# Patient Record
(demographics unavailable — no encounter records)

---

## 2025-03-18 NOTE — END OF VISIT
[Time Spent: ___ minutes] : I have spent [unfilled] minutes of time on the encounter which excludes teaching and separately reported services. [FreeTextEntry3] :  I, Jeff Shultz, am scribing for and in the presence of Dr. Rosa Maria Jara in the following sections: HISTORY OF PRESENT ILLNESS; REVIEW OF SYSTEMS; PHYSICAL EXAM; ASSESSMENT/ PLAN.I, Rosa Maria Jara, personally performed the services described in the documentation, reviewed the documentation recorded by the scribe in my presence, and it accurately and completely records my words and actions. 03/17/25

## 2025-03-18 NOTE — HISTORY OF PRESENT ILLNESS
[FreeTextEntry1] : Last Reclast 6/17/2022 Mr. PAPO BARNES is a 61 year old male  coming for a f/u past hospital admission for severe hypophosphatemia secondary to hyperparathyroidism which was secondary to hypocalcemia, has not been taking his calcium supplements for few days as he was not feeling good Seen me for severe osteoporosis with h/o bilateral hip fractures, hyperparathyroidism, low D and hypocalcemia secondary to Chron's disease on Stelara shot every other month Dr Valencia  on Vit D 50,000 IU 3 times a week alternating with 2 times a week every other week , he held it for a month of May only, levels high 4/23  started Forteo May 10 2019 then first Reclast infusion in May 2021 with no side effects last DEXA 10/2022 mildy worse after 9/21 much improved likely due to not enough calcium  on IV calcium each Friday at the infusion center, last one a month ago  labs done much improved   Ca citrate 950mg 3 tab bid  Clacium IV every other week  also iron IV every 6 weeks  reports compliance   24hr urine low calcium while low D    total thyroidectomy and central compartment  dissection 9/11/2020 Pathology showing tracheal lymph node positive for metastatic papillary thyroid carcinoma 0.9 cm. Papillary thyroid carcinoma classic variant left sided 0.8 cm in greatest dimension. No evidence of lymphatic or vascular invasion no extra thyroid extension. 8 of 10 lymph nodes positive for metastatic papillary thyroid carcinoma.  Large test metastatic focus is 0.7 cm in greatest dimension extra low dose extension present. Thyroid gland left sided within normal limits. Tihymic tissue present.  received iodine 131 at 29.3 mCi in October 2020 Posterior ideation scan showed focal site of increased tracer localization in the neck to the right of midline as was evident on prior study on October 23, 1920.  started Levothyroxine 125 mcg on 9/18/2020 no side effects dose increased to 137 mcg on November 2020  last DEXA 9/2020 As compared to the patient's most recent study dated 9/12/2019, there has been a statistically significant interval increase in bone density at both the lumbar spine and left hip with no s tatistically significant change noted at the left forearm. worse T score LFN -2.8  pt went back to 2 vid D per week , Dr Aragon told him he has resistance to Vit D , trying for the last month has Calcium 9.1  6/25/24  had phosphate infusion 4 weeks from Dr Ott   10/01/24 Pt is doing well. He had iron infusion last Friday September 25th and it was right after lab work.  03/17/25 Patient is feeling good. No active complaints. He was let go from his job and is looking for a new one.

## 2025-03-19 NOTE — ASSESSMENT
[FreeTextEntry1] : Crohn's disease  - Check baseline hepatitis panel, repeat TB QuantiFERON. - Continue Stelara - medication approved with insurance. - Annual dermatology - declined referral today.  - Recall EGD and colonoscopy 2026.  - B12 injection administered today.  - F/u with Dr. Saenz scheduled for May.

## 2025-03-19 NOTE — HISTORY OF PRESENT ILLNESS
[de-identified] : Dr. Pritchard  4/5/24 Indications: Crohn's disease and abnormal MRI  Findings:    Esophagus Mucosa Normal mucosa was noted in the whole esophagus.    Stomach Mucosa Normal mucosa was noted in the whole stomach.    Duodenum Mucosa Normal mucosa was noted in the whole examined duodenum.    Jejunum Additional jejunum findings The visualized portion of the jejunum  appeared normal.Multiple cold biopsies were performed in the jejunum.    Impressions:    The visualized portion of the jejunum appeared normal (Biopsy).    Plan:    Await pathology results.    Continue current medical regimen.    Follow up with Referring Provider - Dr. Rubin    Specimens:    Jar # 1 :    Biopsy in the jejunum    Test(s) requested: Histology    Pathology:    Pathology was sent to lab, waiting for results    Sergio Pritchard MD    Version 1, Electronically signed on 4/5/2024 12:34:07 PM by Sergio Pritchard MD   [FreeTextEntry1] : MRE 10/2023 IMPRESSION:  Known Crohn's disease with persistent mural enhancement without wall thickening in distal small bowel loops, which is a nonspecific imaging sign and may reflect inflammation, however no other mural or mesenteric findings to indicate active inflammatory small bowel Crohn's disease.  No evidence of stricture. No imaging signs of penetrating disease.  Chronic central mesenteric fibrofatty proliferation.  MRE 12/2024 IMPRESSION:  *  Stable appearance of mildly patulous distal ileum without evidence for significant disease activity.  *  No evidence of fibrostenotic disease.  *  No evidence of penetrating disease.

## 2025-04-24 NOTE — HEALTH RISK ASSESSMENT
[Good] : ~his/her~  mood as  good [No] : No [No falls in past year] : Patient reported no falls in the past year [0] : 2) Feeling down, depressed, or hopeless: Not at all (0) [Patient reported colonoscopy was normal] : Patient reported colonoscopy was normal [Fully functional (bathing, dressing, toileting, transferring, walking, feeding)] : Fully functional (bathing, dressing, toileting, transferring, walking, feeding) [Fully functional (using the telephone, shopping, preparing meals, housekeeping, doing laundry, using] : Fully functional and needs no help or supervision to perform IADLs (using the telephone, shopping, preparing meals, housekeeping, doing laundry, using transportation, managing medications and managing finances) [Never] : Never [PHQ-2 Negative - No further assessment needed] : PHQ-2 Negative - No further assessment needed [de-identified] : endocrinology, hematology, gastroenterology [de-identified] : walking [de-identified] : regular [[id number]] : 0 [With Family] : lives with family [Employed] : employed [] :  [# Of Children ___] : has [unfilled] children [Feels Safe at Home] : Feels safe at home [Reports changes in hearing] : Reports no changes in hearing [Reports changes in vision] : Reports no changes in vision [Reports normal functional visual acuity (ie: able to read med bottle)] : Reports normal functional visual acuity [ColonoscopyDate] : 12/23 [ColonoscopyComments] : repeat in 2025

## 2025-04-24 NOTE — ADDENDUM
[FreeTextEntry1] : Patient presented for Heplisav B vaccination as ordered by Dr. Clayton. Prior to administration, reviewed Heplisav B VIS with patient who verbalized understanding and consent.  Patient tolerated vaccination well in left deltoid. No immediate adverse reaction noted. Patient provided Heplisav B VIS for home review as per protocol.  Patient instructed to schedule Heplisav B vaccination 1 month from today.  -Dynavax NDC#77416-663-40 Lot#[id number] Exp. 01/31/2027  Ruby Best LPN

## 2025-04-24 NOTE — HISTORY OF PRESENT ILLNESS
[FreeTextEntry1] : Establish care, annual, forms [de-identified] : 62 yo M with complicated medical history, here to establish care with new primary care physician and for annual physical.  Patient is a 62 yo M with Crohn's disease, hx of cryptogenic CVA, hx of DVT x3, Barretts esophagus, s/p total thyroidectomy, hypothyroidism, osteoporosis, hx of copper, iron, b12, zinc deficiency, presenting for annual physical. Patient reports no acute concerns or complaints at this time except needs titers and forms filled out for work employment forms.   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 Patient needs a Hep B vaccination today; non-immune. Recent TB testing is negative.   Denies any headaches, chest pain, shortness of breath, nausea/vomiting, diarrhea, constipation, changes in vision.

## 2025-04-24 NOTE — PLAN
[FreeTextEntry1] : Patient is a 62 yo M with Crohn's disease, hx of cryptogenic CVA, hx of DVT x3, Barretts esophagus, s/p total thyroidectomy, hypothyroidism, osteoporosis, hx of copper, iron, b12, zinc deficiency, presenting for annual physical. Patient reports no acute concerns or complaints at this time except needs titers and forms filled out for work employment forms.   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 Patient needs a Hep B vaccination today; non-immune. Recent TB testing is negative.   Crohn's disease - s/p two bowel resections, 1998, 2015, follows with GI. Now on Stellara q 8 weeks, stable, no acute flares recently, inflammatory markers WNL reviewed labs. Colonoscopies as per GI team.  Hx of CVA on Eliquis, DVT x3 - now on coumadin, goal INR between 2-3, closer to 2.5 ideal, check INR today, taking 3mg x 6 days a week and 1.5mg one day a week Barrets Esophagus - taking PPI daily, endoscopy UTD Hypothyroidism - s/p total thyroidectomy, pathology showing metastatic papillary thyroid carcinoma previously, now on supplementation thyroid, follows with endocrinology, check labs today B12 deficiency - obtains injections q 6 weeks from GI Iron deficiency - q every other month infusion via hematology Hx of copper infusion due to deficiency Zinc deficiency - takes oral supplements Osteoporosis - follows with endocrinology, completed 1.5 years Forteo with improvement in DEXA, now on Reclast annually  Labs drawn in office Will call with results Hep B vaccine today, start series and return for completion x3 doses total Check MMRV immunity today and complete paperwork for employment All questions answered Exam reassuring Follow up specialists as scheduled

## 2025-06-03 NOTE — HISTORY OF PRESENT ILLNESS
[Home] : at home, [unfilled] , at the time of the visit. [Medical Office: ([facility])___] : at the medical office located in  [Telehealth (audio & video)] : This visit was provided via telehealth using real-time 2-way audio visual technology. [Verbal consent obtained from patient] : the patient, [unfilled] [FreeTextEntry1] : Last Reclast 3/28/2024,1/12/2024 and 6/17/2022 Mr. PAPO BARNES is a 61 year old male  coming for a f/u past hospital admission for severe hypophosphatemia secondary to hyperparathyroidism which was secondary to hypocalcemia, has not been taking his calcium supplements for few days as he was not feeling good Seen me for severe osteoporosis with h/o bilateral hip fractures, hyperparathyroidism, low D and hypocalcemia secondary to Chron's disease on Stelara shot every other month Dr Valencia  on Vit D 50,000 IU 3 times a week alternating with 2 times a week every other week , he held it for a month of May only, levels high 4/23  started Forteo May 10 2019 then first Reclast infusion in May 2021 with no side effects last DEXA 10/2022 mildy worse after 9/21 much improved likely due to not enough calcium  on IV calcium each Friday at the infusion center, last one a month ago  labs done much improved   Ca citrate 950mg 3 tab bid  Clacium IV every other week  also iron IV every 6 weeks  reports compliance   24hr urine low calcium while low D    total thyroidectomy and central compartment  dissection 9/11/2020 Pathology showing tracheal lymph node positive for metastatic papillary thyroid carcinoma 0.9 cm. Papillary thyroid carcinoma classic variant left sided 0.8 cm in greatest dimension. No evidence of lymphatic or vascular invasion no extra thyroid extension. 8 of 10 lymph nodes positive for metastatic papillary thyroid carcinoma.  Large test metastatic focus is 0.7 cm in greatest dimension extra low dose extension present. Thyroid gland left sided within normal limits. Tihymic tissue present.  received iodine 131 at 29.3 mCi in October 2020 Posterior ideation scan showed focal site of increased tracer localization in the neck to the right of midline as was evident on prior study on October 23, 1920.  started Levothyroxine 125 mcg on 9/18/2020 no side effects dose increased to 137 mcg on November 2020  last DEXA 9/2020 As compared to the patient's most recent study dated 9/12/2019, there has been a statistically significant interval increase in bone density at both the lumbar spine and left hip with no s tatistically significant change noted at the left forearm. worse T score LFN -2.8  pt went back to 2 vid D per week , Dr Aragon told him he has resistance to Vit D , trying for the last month has Calcium 9.1  6/25/24  had phosphate infusion 4 weeks from Dr Ott   10/01/24 Pt is doing well. He had iron infusion last Friday September 25th and it was right after lab work.  03/17/25 Patient is feeling good. No active complaints. He was let go from his job and is looking for a new one.   06/03/25  Patient is doing well, no active complaints. He has a new job.  Will have bloodwork done on Friday for Dr. Ott, per pt.  Lab missing some orders, such as TSH and Vitamin D. Will contact lab to see if they can have added to labwork. If not, will see if it can be added to Dr. Ott's labwork to be done Friday 6/6. Advised to follow up in 5 days.

## 2025-06-03 NOTE — HISTORY OF PRESENT ILLNESS
[Home] : at home, [unfilled] , at the time of the visit. [Medical Office: ([facility])___] : at the medical office located in  [Telehealth (audio & video)] : This visit was provided via telehealth using real-time 2-way audio visual technology. [Verbal consent obtained from patient] : the patient, [unfilled] [FreeTextEntry1] : Last Reclast 3/28/2024,1/12/2024 and 6/17/2022 Mr. PAPO BARNES is a 61 year old male  coming for a f/u past hospital admission for severe hypophosphatemia secondary to hyperparathyroidism which was secondary to hypocalcemia, has not been taking his calcium supplements for few days as he was not feeling good Seen me for severe osteoporosis with h/o bilateral hip fractures, hyperparathyroidism, low D and hypocalcemia secondary to Chron's disease on Stelara shot every other month Dr Valencia  on Vit D 50,000 IU 3 times a week alternating with 2 times a week every other week , he held it for a month of May only, levels high 4/23  started Forteo May 10 2019 then first Reclast infusion in May 2021 with no side effects last DEXA 10/2022 mildy worse after 9/21 much improved likely due to not enough calcium  on IV calcium each Friday at the infusion center, last one a month ago  labs done much improved   Ca citrate 950mg 3 tab bid  Clacium IV every other week  also iron IV every 6 weeks  reports compliance   24hr urine low calcium while low D    total thyroidectomy and central compartment  dissection 9/11/2020 Pathology showing tracheal lymph node positive for metastatic papillary thyroid carcinoma 0.9 cm. Papillary thyroid carcinoma classic variant left sided 0.8 cm in greatest dimension. No evidence of lymphatic or vascular invasion no extra thyroid extension. 8 of 10 lymph nodes positive for metastatic papillary thyroid carcinoma.  Large test metastatic focus is 0.7 cm in greatest dimension extra low dose extension present. Thyroid gland left sided within normal limits. Tihymic tissue present.  received iodine 131 at 29.3 mCi in October 2020 Posterior ideation scan showed focal site of increased tracer localization in the neck to the right of midline as was evident on prior study on October 23, 1920.  started Levothyroxine 125 mcg on 9/18/2020 no side effects dose increased to 137 mcg on November 2020  last DEXA 9/2020 As compared to the patient's most recent study dated 9/12/2019, there has been a statistically significant interval increase in bone density at both the lumbar spine and left hip with no s tatistically significant change noted at the left forearm. worse T score LFN -2.8  pt went back to 2 vid D per week , Dr Aragon told him he has resistance to Vit D , trying for the last month has Calcium 9.1  6/25/24  had phosphate infusion 4 weeks from Dr Ott   10/01/24 Pt is doing well. He had iron infusion last Friday September 25th and it was right after lab work.  03/17/25 Patient is feeling good. No active complaints. He was let go from his job and is looking for a new one.   06/03/25  Patient is doing well, no active complaints. He has a new job.  Will have bloodwork done on Friday for Dr. Ott, per pt.  Lab missing some orders, such as TSH and Vitamin D. Will contact lab to see if they can have added to labwork. If not, will see if it can be added to Dr. Ott's labwork to be done Friday 6/6. Advised to follow up in 5 days.

## 2025-06-03 NOTE — PHYSICAL EXAM
[Alert] : alert [Well Nourished] : well nourished [No Acute Distress] : no acute distress [Well Developed] : well developed [No Respiratory Distress] : no respiratory distress [No Rash] : no rash [Normal Reflexes] : deep tendon reflexes were 2+ and symmetric [No Tremors] : no tremors [Oriented x3] : oriented to person, place, and time [Acanthosis Nigricans] : no acanthosis nigricans [de-identified] : on face

## 2025-06-03 NOTE — REASON FOR VISIT
[Follow - Up] : a follow-up visit [Hypothyroidism] : hypothyroidism [Osteoporosis] : osteoporosis [Hyperparathyroidism] : hyperparathyroidism [Thyroid Cancer] : thyroid cancer [Other___] : [unfilled]

## 2025-06-03 NOTE — END OF VISIT
[Time Spent: ___ minutes] : I have spent [unfilled] minutes of time on the encounter which excludes teaching and separately reported services. [FreeTextEntry3] :  I, Faith Boone, am scribing for and in the presence of Dr. Rosa Maria Jara in the following sections: HISTORY OF PRESENT ILLNESS; REVIEW OF SYSTEMS; PHYSICAL EXAM; ASSESSMENT/ PLAN.I, Rosa Maria Jara, personally performed the services described in the documentation, reviewed the documentation recorded by the scribe in my presence, and it accurately and completely records my words and actions. 06/03/25

## 2025-06-03 NOTE — PHYSICAL EXAM
[Alert] : alert [Well Nourished] : well nourished [No Acute Distress] : no acute distress [Well Developed] : well developed [No Respiratory Distress] : no respiratory distress [No Rash] : no rash [Normal Reflexes] : deep tendon reflexes were 2+ and symmetric [No Tremors] : no tremors [Oriented x3] : oriented to person, place, and time [Acanthosis Nigricans] : no acanthosis nigricans [de-identified] : on face

## 2025-07-19 NOTE — ASSESSMENT
[FreeTextEntry1] : Crohn's - cont stelara. Cont supplements - iron and b12 ;abs today.  f/u 3 mo.  Colon 10/2026. Cont endocrine/heme f/u.  Stewart's / GERD -cont ppi - Reviewed dietary and lifestyle modifications, including weight loss, smaller/frequent/earlier (>3h prior to lying down) meals, trials of cutting down/out caffeine, chocolate, tomatoes, fatty foods, alcohol.  EGD 10/2026  PMD/consultation/hospital notes and Labs/imaging/prior endoscopic results reviewed to extent noted in HPI; and, if procedure code billed on this visit for lab draw, this serves to signify that labs were drawn here in this office. Pt also advised that any studies ordered, if prior authorization required it may take up to a week to confirm - and if pt has not heard RE: scheduling by a week, they should call this office.

## 2025-07-19 NOTE — HISTORY OF PRESENT ILLNESS
[FreeTextEntry1] : 61m cryptogenic CVA, DVTx3/coumadin, thyroidectomy/thyroid ca, nondysplastic Stewart's, GERD, lonsgstanging crohn's (see below)/resections/stelara ([facility] pt), hx iron/zinc/copper/b12 def (infusions/supplements), here to f/u.  Generally still doing well on stelara.  Normal stools most days - perhaps looser 3d/wk.  No bleeding. No pain. No fever.  Not losing wt.    Getting supplements via heme and endocrine.  B12 at home monthly at home.  Crohn's -  SB resection x2 ('98, '15; [facility], s/p partial R colectomy side-side anastomosis), on stelara since '21 (prior humira, entyvio loss of efficacy) - pred taper 10/2024 - admitted for SBO   Last EGD/colon: 10/2023 MRE -  mural enhancement--> VCE (aMcho) scattered irreg mucosa, poss. erosions 12/2023: EGD/colon - Stewart's, mild elieitis and small apthous ulcer at anastomosis (bx normal), rec EGD/colon12/2026. 4/2024 enteroscopy weber normal to jejunum 12/2024 MRE stable mildly patulous d. ileum w/o significant dz activity; no stnosis/penetrating dz  Soc:  no tobacco or significant EtOH FHx: no FHx GI malignancy or IBD  ROS: Constitutional:: no weight loss, fevers ENT: no deafness Eyes: not blind Neck: no LN Chest: no dyspnea/cough Cardiac: no chest pain Vascular: no leg swelling GI: no abdominal pain, nausea, vomiting, diarrhea, constipation, rectal bleeding, dysphagia, melena unless otherwise noted in HPI : no dysuria, dark urine Skin: no rashes, jaundice Heme: no bleeding Endocrine: no DM unless otherwise stated in HPI  Px: (VS noted below) General: NAD Eyes: anicteric Oropharynx:  clear Neck: no LN Chest: normal respiratory effort CVS: regular Abd: soft, NT, ND, +BS, no HSM; midline scar, no clear hernia. Ext: no atrophy Neuro: grossly nonfocal  Labs/imaging/prior endoscopic results reviewed to the extent available and noted in HPI

## 2025-07-19 NOTE — HISTORY OF PRESENT ILLNESS
[FreeTextEntry1] : 61m cryptogenic CVA, DVTx3/coumadin, thyroidectomy/thyroid ca, nondysplastic Stewart's, GERD, lonsgstanging crohn's (see below)/resections/stelara ([facility] pt), hx iron/zinc/copper/b12 def (infusions/supplements), here to f/u.  Generally still doing well on stelara.  Normal stools most days - perhaps looser 3d/wk.  No bleeding. No pain. No fever.  Not losing wt.    Getting supplements via heme and endocrine.  B12 at home monthly at home.  Crohn's -  SB resection x2 ('98, '15; [facility], s/p partial R colectomy side-side anastomosis), on stelara since '21 (prior humira, entyvio loss of efficacy) - pred taper 10/2024 - admitted for SBO   Last EGD/colon: 10/2023 MRE -  mural enhancement--> VCE ([facility]) scattered irreg mucosa, poss. erosions 12/2023: EGD/colon - Stewart's, mild elieitis and small apthous ulcer at anastomosis (bx normal), rec EGD/colon12/2026. 4/2024 enteroscopy weber normal to jejunum 12/2024 MRE stable mildly patulous d. ileum w/o significant dz activity; no stnosis/penetrating dz  Soc:  no tobacco or significant EtOH FHx: no FHx GI malignancy or IBD  ROS: Constitutional:: no weight loss, fevers ENT: no deafness Eyes: not blind Neck: no LN Chest: no dyspnea/cough Cardiac: no chest pain Vascular: no leg swelling GI: no abdominal pain, nausea, vomiting, diarrhea, constipation, rectal bleeding, dysphagia, melena unless otherwise noted in HPI : no dysuria, dark urine Skin: no rashes, jaundice Heme: no bleeding Endocrine: no DM unless otherwise stated in HPI  Px: (VS noted below) General: NAD Eyes: anicteric Oropharynx:  clear Neck: no LN Chest: normal respiratory effort CVS: regular Abd: soft, NT, ND, +BS, no HSM; midline scar, no clear hernia. Ext: no atrophy Neuro: grossly nonfocal  Labs/imaging/prior endoscopic results reviewed to the extent available and noted in HPI